# Patient Record
Sex: MALE | Race: BLACK OR AFRICAN AMERICAN | NOT HISPANIC OR LATINO | Employment: FULL TIME | URBAN - METROPOLITAN AREA
[De-identification: names, ages, dates, MRNs, and addresses within clinical notes are randomized per-mention and may not be internally consistent; named-entity substitution may affect disease eponyms.]

---

## 2018-11-20 ENCOUNTER — HOSPITAL ENCOUNTER (EMERGENCY)
Facility: HOSPITAL | Age: 28
Discharge: HOME | End: 2018-11-20
Attending: EMERGENCY MEDICINE
Payer: COMMERCIAL

## 2018-11-20 VITALS
HEIGHT: 75 IN | TEMPERATURE: 99 F | OXYGEN SATURATION: 98 % | BODY MASS INDEX: 25.49 KG/M2 | HEART RATE: 85 BPM | RESPIRATION RATE: 18 BRPM | DIASTOLIC BLOOD PRESSURE: 85 MMHG | WEIGHT: 205 LBS | SYSTOLIC BLOOD PRESSURE: 135 MMHG

## 2018-11-20 DIAGNOSIS — R05.9 COUGH: Primary | ICD-10-CM

## 2018-11-20 DIAGNOSIS — J06.9 UPPER RESPIRATORY TRACT INFECTION, UNSPECIFIED TYPE: ICD-10-CM

## 2018-11-20 PROCEDURE — 99282 EMERGENCY DEPT VISIT SF MDM: CPT | Mod: ,,, | Performed by: EMERGENCY MEDICINE

## 2018-11-20 PROCEDURE — 99283 EMERGENCY DEPT VISIT LOW MDM: CPT | Mod: 25

## 2018-11-20 NOTE — ED TRIAGE NOTES
Cough x 6 days, tried mucinex and nyquil with no relief. Since yesterday reports coughing up blood, prior to yesterday reports sputum was green/yellow. Denies fevers/body aches/chills. Denies sob, cp, weakness, lightheadedness. Cough is constant, reports the heat makes it worse and cough is worse at night.

## 2018-11-20 NOTE — DISCHARGE INSTRUCTIONS
If your symptoms do not improve in 1 week, please follow-up with primary care.  You have been given a phone number to Internal Medicine, please establish a primary care physician for follow-up as needed.  If you have any worsening symptoms, or any worsening concerns return to the emergency department.

## 2018-11-20 NOTE — ED PROVIDER NOTES
Encounter Date: 11/20/2018    SCRIBE #1 NOTE: I, Camacho Blandon, am scribing for, and in the presence of,  Glenn Mcdonald DO. I have scribed the entire note.       History     Chief Complaint   Patient presents with    Cough     pt complains of cough x 2 days with 1 episode of coughing pink tinged blood. pt is ao x3. pt denies chest pain nausea,      Pt is a 28 y.o. M who presents to the ED for evaluation of cough with pink tinged sputum. Indicates that he began coughing on 11/14, and on 11/16 he began to cough see pink tinged sputum, associated with yellow-green sputum as well. Denies sinus drainage, fever, chills, watery eyes, CP, abd pain, or any other complaints at this time.  Denies any other aggravating or alleviating factors.  Patient has tried NyQuil and Mucinex with mild results.  He does endorse nasal congestion and rhinorrhea which has improved.  He is able tolerate p.o. liquids and diet well. Denies any nausea, vomiting, diarrhea, abdominal pain, chest pain, back pain, neck pain, leg pain, falls or trauma.          Review of patient's allergies indicates:  No Known Allergies  History reviewed. No pertinent past medical history.  History reviewed. No pertinent surgical history.  History reviewed. No pertinent family history.  Social History     Tobacco Use    Smoking status: Never Smoker    Smokeless tobacco: Never Used   Substance Use Topics    Alcohol use: Yes     Frequency: Never     Comment: weekends    Drug use: No     Review of Systems   Constitutional: Positive for fever. Negative for chills.   HENT: Positive for sneezing. Negative for ear discharge and postnasal drip.    Eyes: Positive for discharge.   Cardiovascular: Negative for chest pain.   Gastrointestinal: Negative for abdominal pain.       Physical Exam     Initial Vitals [11/20/18 0432]   BP Pulse Resp Temp SpO2   139/67 92 20 98.3 °F (36.8 °C) 98 %      MAP       --         Physical Exam    Nursing note and vitals  reviewed.    Gen/Constitutional: Interactive. No acute distress  Head: Normocephalic, Atraumatic  Neck: supple, no masses or LAD  Eyes: PERRLA, conjunctiva clear  Ears, Nose and Throat:  Clear rhinorrhea, nasal congestion and no stridor.  Cardiac: Reg Rhythm, No murmur  Pulmonary: CTA Bilat, no wheezes, rhonchi, rales.  GI: Abdomen soft, non-tender, non-distended; no rebound or guarding  : No CVA tenderness.  Musculoskeletal: Extremities warm, well perfused, no erythema, no edema  Skin: No rashes  Neuro: Alert and Oriented x 3; No focal motor or sensory deficits.    Psych: Normal affect    ED Course   Procedures  Labs Reviewed - No data to display       Imaging Results          X-Ray Chest PA And Lateral (Final result)  Result time 11/20/18 05:24:19    Final result by Lucien Mena MD (11/20/18 05:24:19)                 Impression:      Unremarkable exam.    Electronically signed by resident: Hiram Barillas  Date:    11/20/2018  Time:    05:17    Electronically signed by: Lucien Mena MD  Date:    11/20/2018  Time:    05:24             Narrative:    EXAMINATION:  XR CHEST PA AND LATERAL    CLINICAL HISTORY:  Cough    TECHNIQUE:  PA and lateral views of the chest were performed.    COMPARISON:  None    FINDINGS:  Lungs are symmetrically expanded and clear.  No focal consolidation, large pleural effusion or pneumothorax.    Cardiomediastinal silhouette is within normal limits.    No acute osseous process.                                 Medical Decision Making:   Initial Assessment:   Rajinder Lassiter is a 28 y.o. M who presents to the ED for evaluation of cough.   Differential Diagnosis:   Differential diagnosis includes, but is not limited to:  URI, bronchitis, pneumothorax, asthma, pneumonia, viral syndrome    Well-appearing male in no acute distress. He is afebrile vital signs are stable. One-week history of intermittent cough that worsened last 2-3 days associated with yellow green sputum and 1 episode of  pink sputum.  Low risk for malignancy given age and risk factors.  No previous history of asthma.  Chest x-ray obtained given length of symptoms as well as ongoing cough.  Chest x-ray negative for any pneumothorax, pneumonia, or acute process.  Likely URI, patient given handout for symptomatic treatment and close follow-up with PCP as needed.  Patient given phone number for internal medicine to establish PCP.  No other red flags for any other acute emergent pathology.  Patient agreeable to discharge plan. Strict ED precautions and return instructions discussed at length and patient verbalized understanding. All questions were answered and ample time was given for questions.              Scribe Attestation:   Scribe #1: I performed the above scribed service and the documentation accurately describes the services I performed. I attest to the accuracy of the note.    I, Dr. Glenn Mcdonald, personally performed the services described in this documentation. All medical record entries made by the scribe were at my direction and in my presence.  I have reviewed the chart and agree that the record reflects my personal performance and is accurate and complete.                Clinical Impression:   The primary encounter diagnosis was Cough. A diagnosis of Upper respiratory tract infection, unspecified type was also pertinent to this visit.      Disposition:   Disposition: Discharged  Condition: Stable    Glenn Mcdonald DO  Dept of Emergency Medicine   [facility]  Spectralink: [id number]                     Glenn Mcdonald DO  11/20/18 0537

## 2019-02-26 DIAGNOSIS — Z22.39 CARRIER OF UREAPLASMA UREALYTICUM: Primary | ICD-10-CM

## 2019-02-26 RX ORDER — DOXYCYCLINE HYCLATE 100 MG
100 TABLET ORAL 2 TIMES DAILY
Qty: 28 TABLET | Refills: 0 | Status: SHIPPED | OUTPATIENT
Start: 2019-02-26 | End: 2019-03-12

## 2019-02-26 RX ORDER — METRONIDAZOLE 500 MG/1
500 TABLET ORAL 2 TIMES DAILY
Qty: 28 TABLET | Refills: 0 | Status: SHIPPED | OUTPATIENT
Start: 2019-02-26 | End: 2019-03-12

## 2019-03-25 ENCOUNTER — HOSPITAL ENCOUNTER (EMERGENCY)
Facility: HOSPITAL | Age: 29
Discharge: HOME | End: 2019-03-25
Attending: EMERGENCY MEDICINE
Payer: COMMERCIAL

## 2019-03-25 VITALS
BODY MASS INDEX: 24.99 KG/M2 | RESPIRATION RATE: 14 BRPM | OXYGEN SATURATION: 96 % | WEIGHT: 201 LBS | SYSTOLIC BLOOD PRESSURE: 133 MMHG | HEIGHT: 75 IN | HEART RATE: 91 BPM | DIASTOLIC BLOOD PRESSURE: 67 MMHG | TEMPERATURE: 98 F

## 2019-03-25 DIAGNOSIS — M77.8 SHOULDER TENDINITIS, LEFT: Primary | ICD-10-CM

## 2019-03-25 PROCEDURE — 99284 EMERGENCY DEPT VISIT MOD MDM: CPT | Mod: ,,, | Performed by: EMERGENCY MEDICINE

## 2019-03-25 PROCEDURE — 99284 PR EMERGENCY DEPT VISIT,LEVEL IV: ICD-10-PCS | Mod: ,,, | Performed by: EMERGENCY MEDICINE

## 2019-03-25 PROCEDURE — 99283 EMERGENCY DEPT VISIT LOW MDM: CPT

## 2019-03-25 PROCEDURE — 25000003 PHARM REV CODE 250: Performed by: EMERGENCY MEDICINE

## 2019-03-25 RX ORDER — NAPROXEN 500 MG/1
500 TABLET ORAL
Status: COMPLETED | OUTPATIENT
Start: 2019-03-25 | End: 2019-03-25

## 2019-03-25 RX ORDER — NAPROXEN 500 MG/1
500 TABLET ORAL 2 TIMES DAILY WITH MEALS
Qty: 14 TABLET | Refills: 0 | Status: SHIPPED | OUTPATIENT
Start: 2019-03-25 | End: 2019-04-01

## 2019-03-25 RX ADMIN — NAPROXEN 500 MG: 500 TABLET ORAL at 05:03

## 2019-03-25 NOTE — ED NOTES
Pt a/o x's 4, ambulatory, no resp distress.  C/o L shoulder pain without trauma or obvious deformity.  Ongoing problem for 2 weeks.

## 2019-03-25 NOTE — ED PROVIDER NOTES
Encounter Date: 3/25/2019    SCRIBE #1 NOTE: I, Apolinar Franks, am scribing for, and in the presence of,  Alexis Turpin MD. I have scribed the entire note.       History     Chief Complaint   Patient presents with    Shoulder Pain     Pt reports left shoulder pain X2 weeks, denies recent falls/injury. Pt rpeorts taking tylenol with no relief. Pt has full ROM.      Juan Carlos Lassiter Jr. is a 29 y.o. male patient who presents to the Emergency Department for left shoulder pain with onset 2 weeks ago. Pt works at [employer] and reports he has been lifting a lot of heavy boxes recently. Pain is exacerbated with movement. Pt took Tylenol with no relief. Pt denies any falls, shoulder trauma, back pain, neck pain, fever, N/V/D, numbness, tingling, weakness, and all other sxs at this time.    The history is provided by the patient.     Review of patient's allergies indicates:  No Known Allergies  History reviewed. No pertinent past medical history.  History reviewed. No pertinent surgical history.  History reviewed. No pertinent family history.  Social History     Tobacco Use    Smoking status: Never Smoker    Smokeless tobacco: Never Used   Substance Use Topics    Alcohol use: Yes     Frequency: Never     Comment: weekends    Drug use: No     Review of Systems   Constitutional: Negative for fever.   HENT: Negative for sore throat.    Respiratory: Negative for shortness of breath.    Cardiovascular: Negative for chest pain.   Gastrointestinal: Negative for diarrhea, nausea and vomiting.   Genitourinary: Negative for dysuria.   Musculoskeletal: Negative for back pain and neck pain.        Left shoulder pain.   Skin: Negative for rash.   Neurological: Negative for weakness.   Hematological: Does not bruise/bleed easily.       Physical Exam     Initial Vitals [03/25/19 0458]   BP Pulse Resp Temp SpO2   133/67 91 14 98.1 °F (36.7 °C) 96 %      MAP       --         Physical Exam    Nursing note and vitals  reviewed.  Constitutional: He appears well-developed and well-nourished. He is not diaphoretic. No distress.   HENT:   Head: Normocephalic and atraumatic.   Right Ear: External ear normal.   Left Ear: External ear normal.   Neck: Neck supple.   Cardiovascular: Normal rate, regular rhythm, normal heart sounds and intact distal pulses.   Pulmonary/Chest: Breath sounds normal. No respiratory distress. He has no wheezes. He has no rhonchi. He has no rales.   Abdominal: Soft. He exhibits no distension. There is no tenderness.   Musculoskeletal:        Left shoulder: He exhibits normal range of motion, no tenderness, no bony tenderness, no swelling, no effusion, no crepitus, no deformity, normal pulse and normal strength.   No focal TTP. Pain worsens with active external rotation and abduction of left shoulder but not with passive rotation.   Neurological: He is alert and oriented to person, place, and time. He has normal strength. No sensory deficit. GCS score is 15. GCS eye subscore is 4. GCS verbal subscore is 5. GCS motor subscore is 6.   Skin: Skin is warm. Capillary refill takes less than 2 seconds. No rash noted.   Psychiatric: He has a normal mood and affect.         ED Course   Procedures  Labs Reviewed - No data to display       Imaging Results    None          Medical Decision Making:   History:   Old Medical Records: I decided to obtain old medical records.  ED Management:  Vitals normal. Afebrile.  Well-appearing.  Here with left shoulder pain x2 weeks.  Refractory to Tylenol home.  Clinically appears well. Neuro exam normal. No indication for XR at this time. Suspect rotator cuff injury vs more likely tendinitis. Offered toradol vs naproxen; patient elected for naproxen here in ED. Advised to f/u w/ PCP for possible outpatient MRI. Stable for discharge at this time. Return precautions discussed.             Scribe Attestation:   Scribe #1: I performed the above scribed service and the documentation  accurately describes the services I performed. I attest to the accuracy of the note.               Clinical Impression:       ICD-10-CM ICD-9-CM   1. Shoulder tendinitis, left M75.82 726.10         Disposition:   Disposition: Discharged  Condition: Stable                        Alexis Turpin MD  03/25/19 0532

## 2019-03-25 NOTE — ED TRIAGE NOTES
L shoulder has been bothering pt for 2 weeks, denies trauma.  No obvious deformity.  Reports limited ROM.